# Patient Record
Sex: MALE | Race: WHITE | NOT HISPANIC OR LATINO | Employment: OTHER | URBAN - METROPOLITAN AREA
[De-identification: names, ages, dates, MRNs, and addresses within clinical notes are randomized per-mention and may not be internally consistent; named-entity substitution may affect disease eponyms.]

---

## 2017-02-24 NOTE — PATIENT DISCUSSION
NAION OS WITH HYPERTENSIVE RETINOPATHY - ENCOURAGE GOOD BS/BP CONTROL.  FU WITH PCP FOR EVAL OF CARDIOVASCULAR RISK FACTORS

## 2017-02-24 NOTE — PATIENT DISCUSSION
POAG BY HISTORY - NO FAMILY HISTORY, PREVIOUSLY TREATED ON BETIMOL PRIOR TO CATARACT SURGERY. NORMAL VF. WATCH.

## 2017-12-01 NOTE — PATIENT DISCUSSION
NAION OS STABLE. WITH HYPERTENSIVE RETINOPATHY - ENCOURAGE GOOD BS/BP CONTROL.  FU WITH PCP FOR EVAL OF CARDIOVASCULAR RISK FACTORS

## 2018-12-03 NOTE — PATIENT DISCUSSION
POAG BY HISTORY - NO FAMILY HISTORY, PREVIOUSLY TREATED ON BETIMOL PRIOR TO CATARACT SURGERY. NORMAL VF. STABLE RNFL. SCHEDULE VF IF ANY CHANGES.

## 2020-06-23 ENCOUNTER — CATARACT CONSULT (OUTPATIENT)
Dept: URBAN - METROPOLITAN AREA CLINIC 35 | Facility: CLINIC | Age: 70
End: 2020-06-23

## 2020-06-23 DIAGNOSIS — H25.811: ICD-10-CM

## 2020-06-23 DIAGNOSIS — H25.812: ICD-10-CM

## 2020-06-23 DIAGNOSIS — H43.812: ICD-10-CM

## 2020-06-23 PROCEDURE — 92134 CPTRZ OPH DX IMG PST SGM RTA: CPT

## 2020-06-23 PROCEDURE — 92136TC INTERFEROMETRY - TECHNICAL COMPONENT

## 2020-06-23 PROCEDURE — 92004 COMPRE OPH EXAM NEW PT 1/>: CPT

## 2020-06-23 RX ORDER — DUREZOL 0.5 MG/ML: 1 EMULSION OPHTHALMIC TWICE A DAY

## 2020-06-23 RX ORDER — MOXIFLOXACIN HYDROCHLORIDE 5 MG/ML: 1 SOLUTION/ DROPS OPHTHALMIC

## 2020-06-23 RX ORDER — NEPAFENAC 3 MG/ML: 1 SUSPENSION/ DROPS OPHTHALMIC ONCE A DAY

## 2020-06-23 ASSESSMENT — VISUAL ACUITY
OS_BAT: 20/50
OD_SC: CF 6FT
OD_PH: 20/60
OS_CC: 20/60
OS_SC: 20/70
OD_CC: J8
OD_RAM: 20/40-
OS_PH: 20/30-1
OD_CC: 20/400
OS_CC: J2

## 2020-06-23 ASSESSMENT — TONOMETRY
OD_IOP_MMHG: 14
OS_IOP_MMHG: 12

## 2020-07-23 ENCOUNTER — POST-OP (OUTPATIENT)
Dept: URBAN - METROPOLITAN AREA SURGERY 14 | Facility: SURGERY | Age: 70
End: 2020-07-23

## 2020-07-23 ENCOUNTER — H&P (OUTPATIENT)
Dept: URBAN - METROPOLITAN AREA CLINIC 39 | Facility: CLINIC | Age: 70
End: 2020-07-23

## 2020-07-23 ENCOUNTER — SURGERY/PROCEDURE (OUTPATIENT)
Dept: URBAN - METROPOLITAN AREA CLINIC 35 | Facility: CLINIC | Age: 70
End: 2020-07-23

## 2020-07-23 DIAGNOSIS — H25.811: ICD-10-CM

## 2020-07-23 DIAGNOSIS — H25.812: ICD-10-CM

## 2020-07-23 DIAGNOSIS — Z96.1: ICD-10-CM

## 2020-07-23 PROCEDURE — 99211T TECH SERVICE

## 2020-07-23 PROCEDURE — 6698454 REMOVE CATARACT;INSERT LENS (SX ONLY)

## 2020-07-23 PROCEDURE — 99024 POSTOP FOLLOW-UP VISIT: CPT

## 2020-07-23 ASSESSMENT — VISUAL ACUITY: OD_SC: 20/40

## 2020-07-23 ASSESSMENT — TONOMETRY: OD_IOP_MMHG: 10

## 2020-07-30 ENCOUNTER — POST-OP CATARACT (OUTPATIENT)
Dept: URBAN - METROPOLITAN AREA CLINIC 39 | Facility: CLINIC | Age: 70
End: 2020-07-30

## 2020-07-30 ENCOUNTER — POST OP/EVAL OF SECOND EYE (OUTPATIENT)
Dept: URBAN - METROPOLITAN AREA CLINIC 39 | Facility: CLINIC | Age: 70
End: 2020-07-30

## 2020-07-30 ENCOUNTER — SURGERY/PROCEDURE (OUTPATIENT)
Dept: URBAN - METROPOLITAN AREA CLINIC 35 | Facility: CLINIC | Age: 70
End: 2020-07-30

## 2020-07-30 DIAGNOSIS — H25.812: ICD-10-CM

## 2020-07-30 DIAGNOSIS — Z96.1: ICD-10-CM

## 2020-07-30 PROCEDURE — 99213 OFFICE O/P EST LOW 20 MIN: CPT

## 2020-07-30 PROCEDURE — 6698454 REMOVE CATARACT;INSERT LENS (SX ONLY)

## 2020-07-30 ASSESSMENT — VISUAL ACUITY
OS_SC: 20/40-1
OS_BAT: 20/50 WITH MR
OD_SC: 20/20-2
OS_SC: 20/70

## 2020-07-30 ASSESSMENT — TONOMETRY
OD_IOP_MMHG: 12
OS_IOP_MMHG: 14
OS_IOP_MMHG: 10

## 2021-05-17 NOTE — PATIENT DISCUSSION
NEW SYMPTOMATIC CWS OD AFTER PNEUMONIA RECOVERY, WITH HX HYPERTENSIVE RETINOPATHY - ENCOURAGE GOOD BS/BP CONTROL.  FU WITH PCP FOR EVAL OF CARDIOVASCULAR RISK FACTORS

## 2021-05-17 NOTE — PATIENT DISCUSSION
NAION OS STABLE. POAG BY HISTORY - NO FAMILY HISTORY, PREVIOUSLY TREATED ON BETIMOL PRIOR TO CATARACT SURGERY. NORMAL VF. STABLE RNFL. SCHEDULE VF IF ANY CHANGES.

## 2025-06-12 ENCOUNTER — SURGERY/PROCEDURE (OUTPATIENT)
Age: 75
End: 2025-06-12

## 2025-06-12 ENCOUNTER — CONSULTATION/EVALUATION (OUTPATIENT)
Age: 75
End: 2025-06-12

## 2025-06-12 DIAGNOSIS — H26.493: ICD-10-CM

## 2025-06-12 PROCEDURE — 99204 OFFICE O/P NEW MOD 45 MIN: CPT | Mod: 57
